# Patient Record
Sex: FEMALE | Race: WHITE | NOT HISPANIC OR LATINO | Employment: UNEMPLOYED | URBAN - METROPOLITAN AREA
[De-identification: names, ages, dates, MRNs, and addresses within clinical notes are randomized per-mention and may not be internally consistent; named-entity substitution may affect disease eponyms.]

---

## 2021-08-16 ENCOUNTER — HOSPITAL ENCOUNTER (OUTPATIENT)
Facility: CLINIC | Age: 46
Setting detail: OBSERVATION
Discharge: HOME | End: 2021-08-17
Attending: PHYSICIAN ASSISTANT | Admitting: HOSPITALIST
Payer: COMMERCIAL

## 2021-08-16 DIAGNOSIS — E87.6 HYPOKALEMIA: ICD-10-CM

## 2021-08-16 DIAGNOSIS — R00.0 SINUS TACHYCARDIA: ICD-10-CM

## 2021-08-16 LAB
ALBUMIN SERPL-MCNC: 4.1 G/DL (ref 3.4–5)
ALP SERPL-CCNC: 73 U/L (ref 40–150)
ALT SERPL W P-5'-P-CCNC: 78 U/L (ref 0–50)
ANION GAP SERPL CALCULATED.3IONS-SCNC: 5 MMOL/L (ref 3–14)
AST SERPL W P-5'-P-CCNC: 42 U/L (ref 0–45)
ATRIAL RATE - MUSE: 104 BPM
BASOPHILS # BLD AUTO: 0 10E3/UL (ref 0–0.2)
BASOPHILS NFR BLD AUTO: 0 %
BILIRUB SERPL-MCNC: 0.5 MG/DL (ref 0.2–1.3)
BUN SERPL-MCNC: 11 MG/DL (ref 7–30)
CALCIUM SERPL-MCNC: 9.4 MG/DL (ref 8.5–10.1)
CHLORIDE BLD-SCNC: 98 MMOL/L (ref 94–109)
CO2 SERPL-SCNC: 31 MMOL/L (ref 20–32)
CREAT SERPL-MCNC: 0.8 MG/DL (ref 0.52–1.04)
DIASTOLIC BLOOD PRESSURE - MUSE: NORMAL MMHG
EOSINOPHIL # BLD AUTO: 0.1 10E3/UL (ref 0–0.7)
EOSINOPHIL NFR BLD AUTO: 1 %
ERYTHROCYTE [DISTWIDTH] IN BLOOD BY AUTOMATED COUNT: 12.4 % (ref 10–15)
GFR SERPL CREATININE-BSD FRML MDRD: 89 ML/MIN/1.73M2
GLUCOSE BLD-MCNC: 124 MG/DL (ref 70–99)
HCT VFR BLD AUTO: 41.8 % (ref 35–47)
HGB BLD-MCNC: 14.5 G/DL (ref 11.7–15.7)
HOLD SPECIMEN: NORMAL
HOLD SPECIMEN: NORMAL
IMM GRANULOCYTES # BLD: 0 10E3/UL
IMM GRANULOCYTES NFR BLD: 0 %
INTERPRETATION ECG - MUSE: NORMAL
LYMPHOCYTES # BLD AUTO: 2.4 10E3/UL (ref 0.8–5.3)
LYMPHOCYTES NFR BLD AUTO: 26 %
MAGNESIUM SERPL-MCNC: 1.9 MG/DL (ref 1.6–2.3)
MCH RBC QN AUTO: 33.3 PG (ref 26.5–33)
MCHC RBC AUTO-ENTMCNC: 34.7 G/DL (ref 31.5–36.5)
MCV RBC AUTO: 96 FL (ref 78–100)
MONOCYTES # BLD AUTO: 0.6 10E3/UL (ref 0–1.3)
MONOCYTES NFR BLD AUTO: 6 %
NEUTROPHILS # BLD AUTO: 5.9 10E3/UL (ref 1.6–8.3)
NEUTROPHILS NFR BLD AUTO: 67 %
NRBC # BLD AUTO: 0 10E3/UL
NRBC BLD AUTO-RTO: 0 /100
P AXIS - MUSE: 67 DEGREES
PHOSPHATE SERPL-MCNC: 2 MG/DL (ref 2.5–4.5)
PHOSPHATE SERPL-MCNC: 3.1 MG/DL (ref 2.5–4.5)
PLATELET # BLD AUTO: 349 10E3/UL (ref 150–450)
POTASSIUM BLD-SCNC: 2.5 MMOL/L (ref 3.4–5.3)
POTASSIUM BLD-SCNC: 3.1 MMOL/L (ref 3.4–5.3)
POTASSIUM BLD-SCNC: 3.3 MMOL/L (ref 3.4–5.3)
PR INTERVAL - MUSE: 170 MS
PROT SERPL-MCNC: 8.1 G/DL (ref 6.8–8.8)
QRS DURATION - MUSE: 78 MS
QT - MUSE: 354 MS
QTC - MUSE: 465 MS
R AXIS - MUSE: 22 DEGREES
RBC # BLD AUTO: 4.35 10E6/UL (ref 3.8–5.2)
SARS-COV-2 RNA RESP QL NAA+PROBE: NEGATIVE
SODIUM SERPL-SCNC: 134 MMOL/L (ref 133–144)
SYSTOLIC BLOOD PRESSURE - MUSE: NORMAL MMHG
T AXIS - MUSE: 24 DEGREES
TROPONIN I SERPL-MCNC: <0.015 UG/L (ref 0–0.04)
TROPONIN I SERPL-MCNC: <0.015 UG/L (ref 0–0.04)
VENTRICULAR RATE- MUSE: 104 BPM
WBC # BLD AUTO: 9 10E3/UL (ref 4–11)

## 2021-08-16 PROCEDURE — 36415 COLL VENOUS BLD VENIPUNCTURE: CPT | Performed by: HOSPITALIST

## 2021-08-16 PROCEDURE — 250N000013 HC RX MED GY IP 250 OP 250 PS 637: Performed by: PHYSICIAN ASSISTANT

## 2021-08-16 PROCEDURE — 36415 COLL VENOUS BLD VENIPUNCTURE: CPT | Performed by: PHYSICIAN ASSISTANT

## 2021-08-16 PROCEDURE — 96365 THER/PROPH/DIAG IV INF INIT: CPT

## 2021-08-16 PROCEDURE — C9803 HOPD COVID-19 SPEC COLLECT: HCPCS

## 2021-08-16 PROCEDURE — G0378 HOSPITAL OBSERVATION PER HR: HCPCS

## 2021-08-16 PROCEDURE — 84132 ASSAY OF SERUM POTASSIUM: CPT | Performed by: PHYSICIAN ASSISTANT

## 2021-08-16 PROCEDURE — 84484 ASSAY OF TROPONIN QUANT: CPT | Performed by: PHYSICIAN ASSISTANT

## 2021-08-16 PROCEDURE — 96366 THER/PROPH/DIAG IV INF ADDON: CPT

## 2021-08-16 PROCEDURE — 99207 PR APP CREDIT; MD BILLING SHARED VISIT: CPT | Performed by: PHYSICIAN ASSISTANT

## 2021-08-16 PROCEDURE — 84100 ASSAY OF PHOSPHORUS: CPT | Performed by: PHYSICIAN ASSISTANT

## 2021-08-16 PROCEDURE — 99285 EMERGENCY DEPT VISIT HI MDM: CPT | Mod: 25

## 2021-08-16 PROCEDURE — 85025 COMPLETE CBC W/AUTO DIFF WBC: CPT | Performed by: PHYSICIAN ASSISTANT

## 2021-08-16 PROCEDURE — 250N000011 HC RX IP 250 OP 636: Performed by: PHYSICIAN ASSISTANT

## 2021-08-16 PROCEDURE — 99220 PR INITIAL OBSERVATION CARE,LEVEL III: CPT | Performed by: HOSPITALIST

## 2021-08-16 PROCEDURE — 83735 ASSAY OF MAGNESIUM: CPT | Performed by: PHYSICIAN ASSISTANT

## 2021-08-16 PROCEDURE — 93005 ELECTROCARDIOGRAM TRACING: CPT

## 2021-08-16 PROCEDURE — 87635 SARS-COV-2 COVID-19 AMP PRB: CPT | Performed by: PHYSICIAN ASSISTANT

## 2021-08-16 PROCEDURE — 258N000003 HC RX IP 258 OP 636: Performed by: PHYSICIAN ASSISTANT

## 2021-08-16 PROCEDURE — 80053 COMPREHEN METABOLIC PANEL: CPT | Performed by: PHYSICIAN ASSISTANT

## 2021-08-16 PROCEDURE — 84132 ASSAY OF SERUM POTASSIUM: CPT | Performed by: HOSPITALIST

## 2021-08-16 RX ORDER — CHLORTHALIDONE 25 MG/1
25 TABLET ORAL DAILY
COMMUNITY

## 2021-08-16 RX ORDER — AMOXICILLIN 250 MG
2 CAPSULE ORAL 2 TIMES DAILY PRN
Status: DISCONTINUED | OUTPATIENT
Start: 2021-08-16 | End: 2021-08-17 | Stop reason: HOSPADM

## 2021-08-16 RX ORDER — LOSARTAN POTASSIUM 25 MG/1
25 TABLET ORAL DAILY
COMMUNITY

## 2021-08-16 RX ORDER — OMEPRAZOLE 40 MG/1
40 CAPSULE, DELAYED RELEASE ORAL DAILY PRN
COMMUNITY

## 2021-08-16 RX ORDER — POTASSIUM CHLORIDE 750 MG/1
10 TABLET, EXTENDED RELEASE ORAL DAILY
COMMUNITY

## 2021-08-16 RX ORDER — AMOXICILLIN 250 MG
1 CAPSULE ORAL 2 TIMES DAILY PRN
Status: DISCONTINUED | OUTPATIENT
Start: 2021-08-16 | End: 2021-08-17 | Stop reason: HOSPADM

## 2021-08-16 RX ORDER — DESVENLAFAXINE 50 MG/1
50 TABLET, FILM COATED, EXTENDED RELEASE ORAL DAILY
Status: DISCONTINUED | OUTPATIENT
Start: 2021-08-17 | End: 2021-08-17 | Stop reason: HOSPADM

## 2021-08-16 RX ORDER — POTASSIUM CHLORIDE 750 MG/1
10 TABLET, EXTENDED RELEASE ORAL DAILY
Status: DISCONTINUED | OUTPATIENT
Start: 2021-08-17 | End: 2021-08-17 | Stop reason: HOSPADM

## 2021-08-16 RX ORDER — METOPROLOL SUCCINATE 50 MG/1
50 TABLET, EXTENDED RELEASE ORAL EVERY EVENING
Status: DISCONTINUED | OUTPATIENT
Start: 2021-08-16 | End: 2021-08-17 | Stop reason: HOSPADM

## 2021-08-16 RX ORDER — POLYETHYLENE GLYCOL 3350 17 G/17G
17 POWDER, FOR SOLUTION ORAL DAILY PRN
Status: DISCONTINUED | OUTPATIENT
Start: 2021-08-16 | End: 2021-08-17 | Stop reason: HOSPADM

## 2021-08-16 RX ORDER — POTASSIUM CHLORIDE 1500 MG/1
40 TABLET, EXTENDED RELEASE ORAL ONCE
Status: COMPLETED | OUTPATIENT
Start: 2021-08-16 | End: 2021-08-16

## 2021-08-16 RX ORDER — HYDRALAZINE HYDROCHLORIDE 20 MG/ML
10 INJECTION INTRAMUSCULAR; INTRAVENOUS EVERY 4 HOURS PRN
Status: DISCONTINUED | OUTPATIENT
Start: 2021-08-16 | End: 2021-08-17 | Stop reason: HOSPADM

## 2021-08-16 RX ORDER — ACETAMINOPHEN 650 MG/1
650 SUPPOSITORY RECTAL EVERY 6 HOURS PRN
Status: DISCONTINUED | OUTPATIENT
Start: 2021-08-16 | End: 2021-08-17 | Stop reason: HOSPADM

## 2021-08-16 RX ORDER — POTASSIUM CHLORIDE 7.45 MG/ML
10 INJECTION INTRAVENOUS ONCE
Status: COMPLETED | OUTPATIENT
Start: 2021-08-16 | End: 2021-08-16

## 2021-08-16 RX ORDER — METOPROLOL SUCCINATE 50 MG/1
50 TABLET, EXTENDED RELEASE ORAL EVERY EVENING
COMMUNITY

## 2021-08-16 RX ORDER — PANTOPRAZOLE SODIUM 20 MG/1
40 TABLET, DELAYED RELEASE ORAL 2 TIMES DAILY PRN
Status: DISCONTINUED | OUTPATIENT
Start: 2021-08-16 | End: 2021-08-17 | Stop reason: HOSPADM

## 2021-08-16 RX ORDER — ONDANSETRON 4 MG/1
4 TABLET, ORALLY DISINTEGRATING ORAL EVERY 6 HOURS PRN
Status: DISCONTINUED | OUTPATIENT
Start: 2021-08-16 | End: 2021-08-17 | Stop reason: HOSPADM

## 2021-08-16 RX ORDER — ONDANSETRON 2 MG/ML
4 INJECTION INTRAMUSCULAR; INTRAVENOUS EVERY 6 HOURS PRN
Status: DISCONTINUED | OUTPATIENT
Start: 2021-08-16 | End: 2021-08-17 | Stop reason: HOSPADM

## 2021-08-16 RX ORDER — DESVENLAFAXINE 50 MG/1
50 TABLET, FILM COATED, EXTENDED RELEASE ORAL DAILY
COMMUNITY

## 2021-08-16 RX ORDER — ACETAMINOPHEN 325 MG/1
650 TABLET ORAL EVERY 6 HOURS PRN
Status: DISCONTINUED | OUTPATIENT
Start: 2021-08-16 | End: 2021-08-17 | Stop reason: HOSPADM

## 2021-08-16 RX ORDER — METOPROLOL SUCCINATE 50 MG/1
50 TABLET, EXTENDED RELEASE ORAL EVERY EVENING
Status: DISCONTINUED | OUTPATIENT
Start: 2021-08-16 | End: 2021-08-16

## 2021-08-16 RX ORDER — LOSARTAN POTASSIUM 25 MG/1
25 TABLET ORAL DAILY
Status: DISCONTINUED | OUTPATIENT
Start: 2021-08-17 | End: 2021-08-17 | Stop reason: HOSPADM

## 2021-08-16 RX ADMIN — POTASSIUM & SODIUM PHOSPHATES POWDER PACK 280-160-250 MG 1 PACKET: 280-160-250 PACK at 17:10

## 2021-08-16 RX ADMIN — POTASSIUM CHLORIDE 40 MEQ: 1500 TABLET, EXTENDED RELEASE ORAL at 13:59

## 2021-08-16 RX ADMIN — SODIUM CHLORIDE 1000 ML: 9 INJECTION, SOLUTION INTRAVENOUS at 14:05

## 2021-08-16 RX ADMIN — POTASSIUM CHLORIDE 10 MEQ: 7.46 INJECTION, SOLUTION INTRAVENOUS at 14:00

## 2021-08-16 RX ADMIN — POTASSIUM CHLORIDE 40 MEQ: 1500 TABLET, EXTENDED RELEASE ORAL at 19:32

## 2021-08-16 RX ADMIN — METOPROLOL SUCCINATE 50 MG: 50 TABLET, EXTENDED RELEASE ORAL at 19:22

## 2021-08-16 ASSESSMENT — ENCOUNTER SYMPTOMS
SPEECH DIFFICULTY: 0
CHEST TIGHTNESS: 1
WEAKNESS: 0
MYALGIAS: 0
SHORTNESS OF BREATH: 0
NUMBNESS: 1

## 2021-08-16 ASSESSMENT — MIFFLIN-ST. JEOR
SCORE: 1503.25
SCORE: 1477.76

## 2021-08-16 NOTE — ED PROVIDER NOTES
History   Chief Complaint:  Generalized Weakness and Numbness     HPI   Aurelia Bob is a 45 year old female with history of hypertension and grand mal seizures who presents with generalized numbness. The patient reports a generalized tingling sensation while at work this morning, which has most prominentl in her face. The patient took her blood pressure after experiencing the symptoms, which had a result of about 180/96. She states that the tingling has improved since then, but notes some continues to have some chest tightness. The patient had eaten a pasta salad before symptom onset, which was an atypical meal for her. She reports no recent increase in stress.The patient notes that she has felt tingling in her arms before due to her pinched nerve before, but not generalized like in the episode today. She denies calf pain, chest pain, or shortness of breath. She reports no weakness of her extremities, or difficulty with speech.    Review of Systems   Respiratory: Positive for chest tightness. Negative for shortness of breath.    Cardiovascular: Negative for chest pain.   Musculoskeletal: Negative for myalgias (calf).   Neurological: Positive for numbness (generalized). Negative for speech difficulty and weakness.   All other systems reviewed and are negative.      Allergies:  Bupropion  Escitalopram oxalate  Lisinopril  Sulfa antibiotics  Estrogens  Sertraline    Medications:  Valacyclovir  Gabapentin  Chlorthalidone  Toprol  Potassium chloride  Losartan  Oxycodone    Past Medical History:    Hypertension  Generalized anxiety  Pinched nerve in neck  Premenstrual syndrome  Grand mal seizure  Sacral radiculopathy  Lumbar radiculopathy    Past Surgical History:    Appendectomy  Excision of skin tag  Lasik    Family History:    The patient denies past family history.     Social History:  The patient presents alone    Physical Exam     Patient Vitals for the past 24 hrs:   BP Temp Temp src Pulse Resp SpO2 Height  "Weight   08/16/21 1230 (!) 141/88 -- -- 102 27 99 % -- --   08/16/21 1212 (!) 140/91 -- -- 108 20 97 % -- --   08/16/21 1205 (!) 168/99 98.9  F (37.2  C) Oral 111 16 99 % 1.575 m (5' 2\") 88.5 kg (195 lb)   08/16/21 1200 -- -- -- -- -- 98 % -- --   08/16/21 1151 -- -- -- -- 16 -- -- --   08/16/21 1150 -- -- -- -- -- 97 % -- --       Physical Exam  General: Alert, cooperative  Head:  Scalp is atraumatic.  Eyes:  Normal conjunctiva.   ENT:                                      Ears:  The external ears are normal.   Nose:  The external nose is normal.  Throat:  The oropharynx is normal. Mucus membranes are moist.                 Neck:  Normal range of motion.   CV:  Tachycardic rate. No murmur. 2+ radial pulses  Resp:  Breath sounds are clear bilaterally. Non-labored, no retractions or accessory muscle use.  GI:  Abdomen is soft, no distension, no tenderness.   MS:  Normal range of motion. No acute deformities.   Skin:  Warm and dry. No rash.   Neuro:  Alert. Strength and sensation grossly intact.   Psych:  Awake. Alert.  Appropriate interactions.     Emergency Department Course   ECG  ECG taken at 1230, ECG read at 1239 by Dr. Trierweiler  Sinus tachycardia  Cannot rule out Anterior infarct, age undetermined  Abnormal ECG  Rate 104 bpm. ND interval 170 ms. QRS duration 78 ms. QT/QTc 354/465 ms. P-R-T axes 67 22 24.     Laboratory:  CBC: WBC 9.0, HGB 14.5,    CMP: Potassium 2.5 (L), Glucose 124 (H), ALT 78 (H) o/w WNL (Creatinine 0.80)     Troponin (Collected 1215): <0.015  Magnesium: 1.9  Phosphorus: 2.0 (L)    Asymptomatic COVID19 Virus PCR by nasopharyngeal swab pending    Emergency Department Course:    Reviewed:  I reviewed nursing notes, vitals, past medical history and care everywhere    Assessments:  1215 I obtained history and examined the patient as noted above.   1340 I rechecked the patient and explained findings.   1358 I again rechecked the patient.  1419 I rechecked and updated the patient.  "     Consults:   1430 I spoke with Dr. Donato, hospitalist, who agreed to admit the patient.    Interventions:  1359 Potassium chloride ER 40mEq Oral  1405 NS bolus 1000mL IV  Potassium chloride 10 mEq IV     Disposition:  The patient was admitted to the hospital under the care of Dr. Donato.       Impression & Plan     Medical Decision Making:  Aurelia Bob is a 45 year old female with a medical history including hypertension on chlorthalidone presents emergency department after an episode of full body tingling sensation.  She notes she took her blood pressure and was 180 systolic.  Also notes some heart racing and chest tightness during this time.  EKG reveals sinus tachycardia.  Blood work returned showing hypokalemia of 2.5, no other electrolyte abnormalities.  Magnesium within normal limits.  Patient given oral and IV potassium replacement.  She notes she was at a cabin over the weekend and did not take her potassium supplement, suspect this in the setting of chlorthalidone use is the cause of hypokalemia.  There is no EKG findings of hypokalemia.  Blood pressure trended down nicely in the emergency department.  There is no evidence of endorgan damage, no evidence of hypertensive emergency or urgency.  Given the critically low potassium, will admit to observation for repeat potassium and further monitoring.  Patient agrees with this plan.  I discussed plan with Dr. Donato of the hospitalist service who graciously accepted care of the patient.    Covid-19  Aurelia Bob was evaluated during a global COVID-19 pandemic, which necessitated consideration that the patient might be at risk for infection with the SARS-CoV-2 virus that causes COVID-19.   Applicable protocols for evaluation were followed during the patient's care.   COVID-19 was considered as part of the patient's evaluation. The plan for testing is:  a test was obtained during this visit.    Diagnosis:    ICD-10-CM    1. Hypokalemia  E87.6    2.  Sinus tachycardia  R00.0        Scribe Disclosure:  I, Ger Valentin, am serving as a scribe at 12:15 PM on 8/16/2021 to document services personally performed by Katia Rg PA-C based on my observations and the provider's statements to me.     I, Simone Browne, am serving as a scribe () on 8/16/2021 at 3:15 PM to personally document services performed by Katia Rg PA-C based on my observations and the provider's statements to me.            Katia Rg PA-C  08/16/21 1804

## 2021-08-16 NOTE — ED NOTES
"[facility]  ED Nurse Handoff Report    ED Chief complaint: Generalized Weakness and Numbness      ED Diagnosis:   Final diagnoses:   Hypokalemia   Sinus tachycardia       Code Status: Full Code    Allergies:   Allergies   Allergen Reactions     Bupropion Unknown     Other reaction(s): Seizures  PN: LW Reaction: seizure       Escitalopram Unknown     Other reaction(s): felt like zombie  PN: LW Reaction: dizziness, decrease labido       Estrogens Nausea and Vomiting     Lisinopril Cough     Sertraline Other (See Comments)     Other reaction(s): felt like zombie  \"felt like a zombie\"       Sulfa Drugs      Other reaction(s): Emesis..  PN: LW Reaction: Vomiting       Katie      Other reaction(s): nausea       Patient Story: pt reports she felt tingly in her face and extremities at work today.   Focused Assessment:  See above    Treatments and/or interventions provided: 40 mEq of Potassium tablets, 10 mEq potassium infusion, 1 L NS bolus, labs, EKG  Patient's response to treatments and/or interventions: good    To be done/followed up on inpatient unit:  monitor    Does this patient have any cognitive concerns?: none    Activity level - Baseline/Home:  Independent  Activity Level - Current:   Independent    Patient's Preferred language: English   Needed?: No    Isolation: None  Infection: Not Applicable  Patient tested for COVID 19 prior to admission: YES  Bariatric?: No    Vital Signs:   Vitals:    08/16/21 1200 08/16/21 1205 08/16/21 1212 08/16/21 1230   BP:  (!) 168/99 (!) 140/91 (!) 141/88   Pulse:  111 108 102   Resp:  16 20 27   Temp:  98.9  F (37.2  C)     TempSrc:  Oral     SpO2: 98% 99% 97% 99%   Weight:  88.5 kg (195 lb)     Height:  1.575 m (5' 2\")         Cardiac Rhythm:     Was the PSS-3 completed:   Yes  What interventions are required if any?               Family Comments: none present  OBS brochure/video discussed/provided to patient/family: Yes              Name of person " given brochure if not patient: na              Relationship to patient: na    For the majority of the shift this patient's behavior was Green.   Behavioral interventions performed were none.    ED NURSE PHONE NUMBER: [phone]

## 2021-08-16 NOTE — PHARMACY-ADMISSION MEDICATION HISTORY
Pharmacy Medication History  Admission medication history interview status for the 8/16/2021  admission is complete. See EPIC admission navigator for prior to admission medications     Location of Interview: Patient room  Medication history sources: Patient and Surescripts    Significant changes made to the medication list:  Added the entire list    In the past week, patient estimated taking medication this percent of the time: greater than 90% for all of her medications except for potassium supplement.  She has a pill-box and the potassium tablet is too big to fit in the box.  She did forget to take it this past weekend.    Additional medication history information:       Medication reconciliation completed by provider prior to medication history? No    Time spent in this activity: 15 minutes    Prior to Admission medications    Medication Sig Last Dose Taking? Auth Provider   chlorthalidone (HYGROTON) 25 MG tablet Take 25 mg by mouth daily 8/16/2021 at am Yes Unknown, Entered By History   desvenlafaxine (PRISTIQ) 50 MG 24 hr tablet Take 50 mg by mouth daily 8/16/2021 at am Yes Unknown, Entered By History   losartan (COZAAR) 25 MG tablet Take 25 mg by mouth daily 8/16/2021 at am Yes Unknown, Entered By History   metoprolol succinate ER (TOPROL-XL) 50 MG 24 hr tablet Take 50 mg by mouth every evening 8/15/2021 at pm Yes Unknown, Entered By History   omeprazole (PRILOSEC) 40 MG DR capsule Take 40 mg by mouth daily as needed (heartburn) prn Yes Unknown, Entered By History   potassium chloride ER (KLOR-CON M) 10 MEQ CR tablet Take 10 mEq by mouth daily 8/16/2021 at am Yes Unknown, Entered By History       The information provided in this note is only as accurate as the sources available at the time of update(s)

## 2021-08-16 NOTE — H&P
Admitted: 08/16/2021    CHIEF COMPLAINT:  Generalized numbness and tingling.    History obtained from the patient and chart review.    HISTORY OF PRESENT ILLNESS:  Aurelia Bob is a 45-year-old female with past medical history of cervical radiculopathy, lumbar radiculopathy with intermittent bouts of sciatica, hypertension, lower extremity edema and one time grand mal seizure over 20 years ago, who presented to the Emergency Department for further evaluation of generalized numbness and tingling.  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  She told the Alisson at work of her symptoms who checked her blood pressure and reported it to be 180/96 and she was promptly brought to the Emergency Department.    In the Emergency Department, the patient was seen and assessed by Katia Rg PA-C, who obtained initial vital signs with a heart rate of 111 and a blood pressure of 168/99.  Basic labs were obtained, which revealed a potassium of 2.0 and an ALT of 78, AST of 42, magnesium 1.9, phosphorus of 2.0.  CBC unremarkable.  Initial troponin negative.  EKG was sinus tachycardia, no overt ST depression or elevation or T-wave abnormalities.  The patient was given 1 liter bolus and potassium 40 mEq oral and additional 10 mEq IV.  Ultimately hospitalist service was contacted for observation admission.    On my interview, the patient confirms the above history.  She reports that her generalized numbness and tingling is slowly improving, but not completely resolved.  No prior episodes.  No recent illness.  Denies nausea, vomiting, diarrhea, URI symptoms.  No recent medication changes. Maintained on Losartan, Chlorthalidone and Toprol XL as well as potassium supplementation. The patient does report compliance with medications; however, missed 2 doses of potassium over the weekend as she was traveling.  She denies any  "active chest pain or palpitations.  No recent syncope.    REVIEW OF SYSTEMS:  A 10-point review of systems was performed and is otherwise negative unless specified in the HPI.    PAST MEDICAL HISTORY:    1.  Cervical radiculopathy with intermittent numbness and tingling in her hands.  2.  Hypertension.  3.  Grand mal seizure occurred 23 years ago, attributed to polypharmacy while being on high dose Wellbutrin for nicotine for smoking cessation, Dramamine and a Pina Colada while traveling in the [facility]. No recurrent seizures since that time was never maintained on antiepileptic drugs.  4.  Lumbar radiculopathy with intermittent sciatica.  5.  Lower extremity edema.    PTA MEDICATIONS:    Medications Prior to Admission   Medication Sig Dispense Refill Last Dose     chlorthalidone (HYGROTON) 25 MG tablet Take 25 mg by mouth daily   8/16/2021 at am     desvenlafaxine (PRISTIQ) 50 MG 24 hr tablet Take 50 mg by mouth daily   8/16/2021 at am     losartan (COZAAR) 25 MG tablet Take 25 mg by mouth daily   8/16/2021 at am     metoprolol succinate ER (TOPROL-XL) 50 MG 24 hr tablet Take 50 mg by mouth every evening   8/15/2021 at pm     omeprazole (PRILOSEC) 40 MG DR capsule Take 40 mg by mouth daily as needed (heartburn)   prn     potassium chloride ER (KLOR-CON M) 10 MEQ CR tablet Take 10 mEq by mouth daily   8/16/2021 at am     ALLERGIES:      Allergies   Allergen Reactions     Bupropion Unknown     Other reaction(s): Seizures  PN: LW Reaction: seizure       Escitalopram Unknown     Other reaction(s): felt like zombie  PN: LW Reaction: dizziness, decrease labido       Estrogens Nausea and Vomiting     Lisinopril Cough     Sertraline Other (See Comments)     Other reaction(s): felt like zombie  \"felt like a zombie\"       Sulfa Drugs      Other reaction(s): Emesis..  PN: LW Reaction: Vomiting       Katie      Other reaction(s): nausea     PAST SURGICAL HISTORY:    1.  Appendectomy.  2.  Lasik.  3.  Skin tag " "removal.    FAMILY HISTORY:  Father with type 2 diabetes mellitus.    SOCIAL HISTORY:  No tobacco use, social alcohol use on the weekends.  No recreational drug use.    LABORATORY DATA:  Reviewed prior to beginning on the HPI.    IMAGING:  In the HPI.    BP (!) 157/98   Pulse 85   Temp 98.9  F (37.2  C) (Oral)   Resp 17   Ht 1.575 m (5' 2\")   Wt 88.5 kg (195 lb)   SpO2 100%   BMI 35.67 kg/m      CONSTITUTIONAL: Pt laying in bed, dressed in hospital garb. Appears comfortable. Cooperative with interview.  HEENT: Normocephalic, atraumatic.   CARDIOVASCULAR: RRR, no murmurs, rubs, or extra heart sounds appreciated. Pulses +2/4 and regular in upper and lower extremities, bilaterally.   RESPIRATORY: No increased work of breathing. CTA, bilat; no wheezes, rales, or rhonchi appreciated.  GASTROINTESTINAL:  Abdomen soft, non-distended. BS auscultated in all four quadrants. Negative for tenderness to palpation.  No masses or organomegaly noted.  MUSCULOSKELETAL: Strength +5/5 in upper and lower extremities, bilaterally. No gross deformities noted. Normal muscle tone.   HEMATOLOGIC/LYMPHATIC/IMMUNOLOGIC: Negative for lower extremity edema, bilaterally.  NEUROLOGIC: Alert and oriented to person, place, and time.  strength intact. CN's II-XII grossly intact. Sensation equal and intact in upper and lower extremities, bilat.   SKIN: Warm, dry, intact. No jaundice noted. Negative for suspicious lesions, rashes, bruising, open sores or abrasions.     IMPRESSION:  Aurelia Bob is a 45-year-old female with past medical history of hypertension, cervical radiculopathy with intermittent upper extremity paresthesias, lumbar radiculopathy with intermittent sciatica and lower extremity edema, who presented from work with generalized numbness and tingling, found to have elevated blood pressures and hypokalemia as well as hypophosphatemia registered under observation status for further evaluation and treatment.    Generalized " numbness and tingling  Hypokalemia  Hypophosphatemia  Uncontrolled benign essential hypertension:  History as above.  Admission potassium 2.5, magnesium 1.9, phosphorus 2.0, ALT 78.  Remainder of CMP unremarkable.  CBC within normal range.  Troponin negative.  EKG was sinus tachycardia, no ST depression or elevation or T-wave abnormalities.  Symptom onset abruptly while at work, cannot localize area where symptoms started slowly improving since being in the Emergency Department.  Note, the patient missed 2 doses of potassium supplementation over the weekend.  Denies nausea, vomiting, diarrhea, chest pain, shortness of breath, dizziness, lightheadedness.  Received 40 mEq oral potassium plus 10 mEq IV potassium in the ED.  She received 1 liter bolus.  --Registered observation.  --Recheck potassium at 6:00 p.m. replacement protocol thereafter.  --Phosphorus replacement per protocol.  --Telemetry.  --Monitor blood pressure.  We will continue PTA Toprol XL (due for dose this evening) and Losartan (next due tomorrow AM) without dose adjustment at this time.  Hold chlorthalidone (last dose this AM) until potassium corrected). Need to collect more data points as blood pressure is naturally trending down and the patient reports having taken a.m. doses of blood pressure medications. Will make further dose adjustments based on future readings.  The patient was instructed to obtain a blood pressure cuff at home and record values daily and follow up with primary care provider closely.  -- Continue to monitor symptoms.  No focal neuro deficits on exam.    Cervical radiculopathy with intermittent upper extremity paresthesias.  Lumbar radiculopathy with intermittent sciatica:  The patient reports symptoms have been well controlled previously, prescribed gabapentin and oxycodone for intermittent pain and sciatica, but the patient has not been using.    Sinus tachycardia, resolved: Improved with IVF bolus in the ED.     Deep venous  thrombosis prophylaxis:  Ambulate.    COVID STATUS:  PCR negative on admission.    CODE STATUS:  Full code.    This patient was seen and assessed with Dr. Donato of the hospitalist service, who agrees with the plan as outlined above.    DISPOSITION:  Will follow up on repeat potassium and phosphorus levels. Will follow up on blood pressures at that time as well and reevaluate dismissal later tonight versus tomorrow morning.    Paul Donato MD    As Dictated by GARETH MARCANO PA-C        D: 2021   T: 2021   MT: [id number]    Name:     KAMRYN BALTAZAR  MRN:      [id number]        Account:     [id number]   :      1975           Admitted:    2021       Document: [id number]

## 2021-08-16 NOTE — PROGRESS NOTES
RECEIVING UNIT ED HANDOFF REVIEW    ED Nurse Handoff Report was reviewed by: Darby López RN on August 16, 2021 at 4:49 PM

## 2021-08-17 VITALS
HEIGHT: 62 IN | OXYGEN SATURATION: 97 % | HEART RATE: 92 BPM | SYSTOLIC BLOOD PRESSURE: 130 MMHG | WEIGHT: 199.6 LBS | RESPIRATION RATE: 18 BRPM | TEMPERATURE: 98.5 F | BODY MASS INDEX: 36.73 KG/M2 | DIASTOLIC BLOOD PRESSURE: 85 MMHG

## 2021-08-17 LAB
ANION GAP SERPL CALCULATED.3IONS-SCNC: 4 MMOL/L (ref 3–14)
BASOPHILS # BLD AUTO: 0 10E3/UL (ref 0–0.2)
BASOPHILS NFR BLD AUTO: 1 %
BUN SERPL-MCNC: 8 MG/DL (ref 7–30)
CALCIUM SERPL-MCNC: 8.9 MG/DL (ref 8.5–10.1)
CHLORIDE BLD-SCNC: 107 MMOL/L (ref 94–109)
CO2 SERPL-SCNC: 28 MMOL/L (ref 20–32)
CREAT SERPL-MCNC: 0.83 MG/DL (ref 0.52–1.04)
EOSINOPHIL # BLD AUTO: 0.1 10E3/UL (ref 0–0.7)
EOSINOPHIL NFR BLD AUTO: 2 %
ERYTHROCYTE [DISTWIDTH] IN BLOOD BY AUTOMATED COUNT: 12.5 % (ref 10–15)
GFR SERPL CREATININE-BSD FRML MDRD: 85 ML/MIN/1.73M2
GLUCOSE BLD-MCNC: 92 MG/DL (ref 70–99)
HCT VFR BLD AUTO: 40.3 % (ref 35–47)
HGB BLD-MCNC: 13.4 G/DL (ref 11.7–15.7)
IMM GRANULOCYTES # BLD: 0 10E3/UL
IMM GRANULOCYTES NFR BLD: 0 %
LYMPHOCYTES # BLD AUTO: 2.2 10E3/UL (ref 0.8–5.3)
LYMPHOCYTES NFR BLD AUTO: 34 %
MCH RBC QN AUTO: 33.2 PG (ref 26.5–33)
MCHC RBC AUTO-ENTMCNC: 33.3 G/DL (ref 31.5–36.5)
MCV RBC AUTO: 100 FL (ref 78–100)
MONOCYTES # BLD AUTO: 0.4 10E3/UL (ref 0–1.3)
MONOCYTES NFR BLD AUTO: 7 %
NEUTROPHILS # BLD AUTO: 3.5 10E3/UL (ref 1.6–8.3)
NEUTROPHILS NFR BLD AUTO: 56 %
NRBC # BLD AUTO: 0 10E3/UL
NRBC BLD AUTO-RTO: 0 /100
PLATELET # BLD AUTO: 298 10E3/UL (ref 150–450)
POTASSIUM BLD-SCNC: 3.6 MMOL/L (ref 3.4–5.3)
RBC # BLD AUTO: 4.04 10E6/UL (ref 3.8–5.2)
SODIUM SERPL-SCNC: 139 MMOL/L (ref 133–144)
WBC # BLD AUTO: 6.3 10E3/UL (ref 4–11)

## 2021-08-17 PROCEDURE — 99217 PR OBSERVATION CARE DISCHARGE: CPT | Performed by: PHYSICIAN ASSISTANT

## 2021-08-17 PROCEDURE — 36415 COLL VENOUS BLD VENIPUNCTURE: CPT | Performed by: PHYSICIAN ASSISTANT

## 2021-08-17 PROCEDURE — 250N000013 HC RX MED GY IP 250 OP 250 PS 637: Performed by: PHYSICIAN ASSISTANT

## 2021-08-17 PROCEDURE — G0378 HOSPITAL OBSERVATION PER HR: HCPCS

## 2021-08-17 PROCEDURE — 250N000013 HC RX MED GY IP 250 OP 250 PS 637: Performed by: HOSPITALIST

## 2021-08-17 PROCEDURE — 85025 COMPLETE CBC W/AUTO DIFF WBC: CPT | Performed by: PHYSICIAN ASSISTANT

## 2021-08-17 PROCEDURE — 80048 BASIC METABOLIC PNL TOTAL CA: CPT | Performed by: PHYSICIAN ASSISTANT

## 2021-08-17 RX ORDER — POTASSIUM CHLORIDE 1500 MG/1
40 TABLET, EXTENDED RELEASE ORAL ONCE
Status: COMPLETED | OUTPATIENT
Start: 2021-08-17 | End: 2021-08-17

## 2021-08-17 RX ADMIN — LOSARTAN POTASSIUM 25 MG: 25 TABLET, FILM COATED ORAL at 07:33

## 2021-08-17 RX ADMIN — POTASSIUM CHLORIDE 40 MEQ: 1500 TABLET, EXTENDED RELEASE ORAL at 02:27

## 2021-08-17 RX ADMIN — DESVENLAFAXINE SUCCINATE 50 MG: 50 TABLET, EXTENDED RELEASE ORAL at 07:34

## 2021-08-17 RX ADMIN — POTASSIUM CHLORIDE 10 MEQ: 750 TABLET, EXTENDED RELEASE ORAL at 07:33

## 2021-08-17 ASSESSMENT — MIFFLIN-ST. JEOR: SCORE: 1498.63

## 2021-08-17 NOTE — PROGRESS NOTES
Follow-up chart check: Repeat potassium 3.1, phos normalized at 3.1, trop negative. BP remains elevated with last check at 167/99. Per bedside RN symptoms improving. Toprol XL given early for BP. Will plan to continue to replete potassium and monitor blood pressure overnight, can uptitrate AM meds if no improvement after evening Toprol XL dosing. RN to ensure appropriate size BP cuff being used. Likely discharge tomorrow AM.

## 2021-08-17 NOTE — PLAN OF CARE
Observation goals PRIOR TO DISCHARGE    Comments:   - Potassium and phosphorus repleted: Partially met  - Blood pressure improved: Met  - Improvement in paresthesias: Partially met

## 2021-08-17 NOTE — PLAN OF CARE
Patient is alert and oriented x4. VSS on room air. PIV saline locked. Regular diet. Up IND. Tele SR. K+ 3.3. K+ 40 meq supplement given. Denies numbness and tingling. Continue to monitor.  Possible discharge today.   Observation goals PRIOR TO DISCHARGE    Comments:   - Potassium and phosphorus repleted: Partially met  - Blood pressure improved: Met  - Improvement in paresthesias: Partially met

## 2021-08-17 NOTE — DISCHARGE SUMMARY
[facility]    Discharge Summary  Hospitalist    Date of Admission:  8/16/2021  Date of Discharge:  8/17/2021  Discharging Provider: Braxton Abraham  Date of Service (when I saw the patient): 08/17/21    Discharge Diagnoses   1. Generalized numbness and tingling, likely due to electrolyte disturbance  2. Hypokalemia  3. Hypophosphatemia  4. Uncontrolled benign essential hypertension  5. Cervical radiculopathy with intermittent upper extremity paresthesias  6. Lumbar radiculopathy with intermittent sciatica  7. Sinus tachycardia, resolved    History of Present Illness   Aurelia Bob is a 45-year-old female with past medical history of hypertension, cervical radiculopathy with intermittent upper extremity paresthesias, lumbar radiculopathy with intermittent sciatica and lower extremity edema, who presented from work with generalized numbness and tingling, found to have elevated blood pressures and hypokalemia as well as hypophosphatemia registered under observation status for further evaluation and treatment.    Please refer to the history and physical from Jovita Sanchez PA-C for additional details.    Hospital Course   Generalized numbness and tingling, likely due to electrolyte disturbance  Hypokalemia  Hypophosphatemia  Uncontrolled benign essential hypertension    History as above.  Admission potassium 2.5, magnesium 1.9, phosphorus 2.0, ALT 78.  Remainder of CMP unremarkable.  CBC within normal range.  Troponin negative.  EKG was sinus tachycardia, no ST depression or elevation or T-wave abnormalities.  Symptom onset abruptly while at work, cannot localize area where symptoms started slowly improving since being in the Emergency Department.  Note, the patient missed 2 doses of potassium supplementation over the weekend.  Denies nausea, vomiting, diarrhea, chest pain, shortness of breath, dizziness, lightheadedness.  Received 40 mEq oral potassium plus 10 mEq IV potassium in the  ED.  She received 1 liter bolus.  --Registered observation.  --Potassium replaced per protocol, K 2.5--3.1--3.3--3.6  --Phosphorus replaced and now nl at 3.1  --Telemetry monitoring shows sinus rhythm  --Blood pressures are stable  --Symptoms have completely resolved  --Continue PTA Toprol XL and Losartan without dose adjustment at this time.  Can resume chlorthalidone at discharge.  --The patient was instructed to obtain a blood pressure cuff at home and record values daily and follow up with primary care provider closely.     Cervical radiculopathy with intermittent upper extremity paresthesias.  Lumbar radiculopathy with intermittent sciatica  The patient reports symptoms have been well controlled previously, prescribed gabapentin and oxycodone for intermittent pain and sciatica, but the patient has not been using.     Sinus tachycardia, resolved: Improved with IVF bolus in the ED.       Braxton Abraham PA-C    Significant Results and Procedures   As documented above    Pending Results   None    Code Status   Full Code       Primary Care Physician   Physician No Ref-Primary    Physical Exam   Temp: 98  F (36.7  C) Temp src: Oral BP: 132/89 Pulse: 73   Resp: 16 SpO2: 97 % O2 Device: None (Room air)          Vitals:     08/16/21 1205 08/16/21 1722 08/17/21 0609   Weight: 88.5 kg (195 lb) 91 kg (200 lb 9.9 oz) 90.5 kg (199 lb 9.6 oz)      Vital Signs with Ranges  Temp:  [96  F (35.6  C)-98.9  F (37.2  C)] 98  F (36.7  C)  Pulse:  [] 73  Resp:  [16-27] 16  BP: (117-168)/() 132/89  SpO2:  [97 %-100 %] 97 %  No intake/output data recorded.     Constitutional: Alert, oriented to person, place, date, situation.  Cooperative, sitting up in bed in NAD.   Respiratory:  Lungs clear, no labored breathing.  No accessory muscle use.  Cardiovascular:   Well-perfused.  NSR.  Skin/Integumen:  Dry, non-diaphoretic, no rashes on exposed skin.  MSK:  Motor function and strength grossly intact in all 4 extremities.   "No edema.      Discharge Disposition   Discharged to home  Condition at discharge: Stable    Consultations This Hospital Stay   None      Discharge Orders      Reason for your hospital stay    Generalized numbness and tingling, likely related to electrolyte disturbance from hypokalemia and hypophosphatemia.  You were also noted to have uncontrolled hypertension, which is now improved.     Follow-up and recommended labs and tests     Follow up with primary care provider within 7 days for hospital follow- up.  Recommend repeat basic metabolic panel.     Activity    Your activity upon discharge: activity as tolerated     Diet    Follow this diet upon discharge: Regular     Discharge Medications   Discharge Medication List as of 8/17/2021 11:06 AM      CONTINUE these medications which have NOT CHANGED    Details   chlorthalidone (HYGROTON) 25 MG tablet Take 25 mg by mouth daily, Historical      desvenlafaxine (PRISTIQ) 50 MG 24 hr tablet Take 50 mg by mouth daily, Historical      losartan (COZAAR) 25 MG tablet Take 25 mg by mouth daily, Historical      metoprolol succinate ER (TOPROL-XL) 50 MG 24 hr tablet Take 50 mg by mouth every evening, Historical      omeprazole (PRILOSEC) 40 MG DR capsule Take 40 mg by mouth daily as needed (heartburn), Historical      potassium chloride ER (KLOR-CON M) 10 MEQ CR tablet Take 10 mEq by mouth daily, Historical           Allergies   Allergies   Allergen Reactions     Bupropion Unknown     Other reaction(s): Seizures  PN: LW Reaction: seizure       Escitalopram Unknown     Other reaction(s): felt like zombie  PN: LW Reaction: dizziness, decrease labido       Estrogens Nausea and Vomiting     Lisinopril Cough     Sertraline Other (See Comments)     Other reaction(s): felt like zombie  \"felt like a zombie\"       Sulfa Drugs      Other reaction(s): Emesis..  PN: LW Reaction: Vomiting       Katie      Other reaction(s): nausea     Data   Most Recent 3 CBC's:  Recent Labs   Lab Test " 08/17/21  0602 08/16/21  1215   WBC 6.3 9.0   HGB 13.4 14.5    96    349      Most Recent 3 BMP's:  Recent Labs   Lab Test 08/17/21  0602 08/16/21  2236 08/16/21  1814 08/16/21  1215     --   --  134   POTASSIUM 3.6 3.3* 3.1* 2.5*   CHLORIDE 107  --   --  98   CO2 28  --   --  31   BUN 8  --   --  11   CR 0.83  --   --  0.80   ANIONGAP 4  --   --  5   ROSARIO 8.9  --   --  9.4   GLC 92  --   --  124*     Most Recent 2 LFT's:  Recent Labs   Lab Test 08/16/21  1215   AST 42   ALT 78*   ALKPHOS 73   BILITOTAL 0.5     Most Recent INR's and Anticoagulation Dosing History:  Anticoagulation Dose History    There is no flowsheet data to display.       Most Recent 3 Troponin's:  Recent Labs   Lab Test 08/16/21  1814 08/16/21  1215   TROPONIN <0.015 <0.015     Most Recent Cholesterol Panel:No lab results found.  Most Recent 6 Bacteria Isolates From Any Culture (See EPIC Reports for Culture Details):No lab results found.  Most Recent TSH, T4 and A1c Labs:No lab results found.  No results found for this or any previous visit.

## 2021-08-17 NOTE — PLAN OF CARE
Observation goals PRIOR TO DISCHARGE    Comments:   - Potassium and phosphorus repleted: Partially met  - Blood pressure improved: Met  - Improvement in paresthesias: Met

## 2021-08-17 NOTE — PROGRESS NOTES
Observation goals PRIOR TO DISCHARGE    Comments:   - Potassium and phosphorus repleted: Partially met  - Blood pressure improved: Met  - Improvement in paresthesias: Partially met    Nurse to notify provider when observation goals have been met and patient is ready for discharge.

## 2021-08-17 NOTE — PLAN OF CARE
Pt is A&Ox4, VSS on RA, Reg diet, Ind in room, IV SL, Reports improvement BLE S/sx of numbness and tingling, denies chx pain, SOB, N/V/D. Tele: SR, K+: 3.1, Phosphorus: 3.1. K+ 40 meq supplement given during shift, continue to monitor, possible dchrg tomorrow morning.

## 2024-05-18 ENCOUNTER — HEALTH MAINTENANCE LETTER (OUTPATIENT)
Age: 49
End: 2024-05-18